# Patient Record
Sex: MALE | Race: WHITE | NOT HISPANIC OR LATINO | Employment: FULL TIME | URBAN - METROPOLITAN AREA
[De-identification: names, ages, dates, MRNs, and addresses within clinical notes are randomized per-mention and may not be internally consistent; named-entity substitution may affect disease eponyms.]

---

## 2024-04-18 ENCOUNTER — HOSPITAL ENCOUNTER (EMERGENCY)
Facility: HOSPITAL | Age: 39
Discharge: HOME | End: 2024-04-19
Attending: EMERGENCY MEDICINE
Payer: COMMERCIAL

## 2024-04-18 DIAGNOSIS — F19.10 SUBSTANCE ABUSE (HCC): ICD-10-CM

## 2024-04-18 DIAGNOSIS — L03.115 CELLULITIS OF RIGHT ANKLE: Primary | ICD-10-CM

## 2024-04-18 LAB
ALBUMIN SERPL BCP-MCNC: 3.9 G/DL (ref 3.5–5)
ALP SERPL-CCNC: 44 U/L (ref 34–104)
ALT SERPL W P-5'-P-CCNC: 11 U/L (ref 7–52)
ANION GAP SERPL CALCULATED.3IONS-SCNC: 7 MMOL/L (ref 4–13)
APTT PPP: 33 SECONDS (ref 23–37)
AST SERPL W P-5'-P-CCNC: 16 U/L (ref 13–39)
BASOPHILS # BLD AUTO: 0.02 THOUSANDS/ÂΜL (ref 0–0.1)
BASOPHILS NFR BLD AUTO: 0 % (ref 0–1)
BILIRUB SERPL-MCNC: 0.66 MG/DL (ref 0.2–1)
BUN SERPL-MCNC: 13 MG/DL (ref 5–25)
CALCIUM SERPL-MCNC: 8.8 MG/DL (ref 8.4–10.2)
CARDIAC TROPONIN I PNL SERPL HS: <2 NG/L
CHLORIDE SERPL-SCNC: 102 MMOL/L (ref 96–108)
CO2 SERPL-SCNC: 30 MMOL/L (ref 21–32)
CREAT SERPL-MCNC: 0.81 MG/DL (ref 0.6–1.3)
EOSINOPHIL # BLD AUTO: 0.2 THOUSAND/ÂΜL (ref 0–0.61)
EOSINOPHIL NFR BLD AUTO: 3 % (ref 0–6)
ERYTHROCYTE [DISTWIDTH] IN BLOOD BY AUTOMATED COUNT: 12.8 % (ref 11.6–15.1)
GFR SERPL CREATININE-BSD FRML MDRD: 112 ML/MIN/1.73SQ M
GLUCOSE SERPL-MCNC: 127 MG/DL (ref 65–140)
HCT VFR BLD AUTO: 39.4 % (ref 36.5–49.3)
HGB BLD-MCNC: 12.7 G/DL (ref 12–17)
IMM GRANULOCYTES # BLD AUTO: 0.01 THOUSAND/UL (ref 0–0.2)
IMM GRANULOCYTES NFR BLD AUTO: 0 % (ref 0–2)
INR PPP: 1.05 (ref 0.84–1.19)
LACTATE SERPL-SCNC: 1.8 MMOL/L (ref 0.5–2)
LYMPHOCYTES # BLD AUTO: 1.2 THOUSANDS/ÂΜL (ref 0.6–4.47)
LYMPHOCYTES NFR BLD AUTO: 19 % (ref 14–44)
MCH RBC QN AUTO: 28 PG (ref 26.8–34.3)
MCHC RBC AUTO-ENTMCNC: 32.2 G/DL (ref 31.4–37.4)
MCV RBC AUTO: 87 FL (ref 82–98)
MONOCYTES # BLD AUTO: 0.66 THOUSAND/ÂΜL (ref 0.17–1.22)
MONOCYTES NFR BLD AUTO: 11 % (ref 4–12)
NEUTROPHILS # BLD AUTO: 4.17 THOUSANDS/ÂΜL (ref 1.85–7.62)
NEUTS SEG NFR BLD AUTO: 67 % (ref 43–75)
NRBC BLD AUTO-RTO: 0 /100 WBCS
PLATELET # BLD AUTO: 182 THOUSANDS/UL (ref 149–390)
PMV BLD AUTO: 9.4 FL (ref 8.9–12.7)
POTASSIUM SERPL-SCNC: 3.7 MMOL/L (ref 3.5–5.3)
PROCALCITONIN SERPL-MCNC: 0.29 NG/ML
PROT SERPL-MCNC: 6.5 G/DL (ref 6.4–8.4)
PROTHROMBIN TIME: 14.4 SECONDS (ref 11.6–14.5)
RBC # BLD AUTO: 4.53 MILLION/UL (ref 3.88–5.62)
SODIUM SERPL-SCNC: 139 MMOL/L (ref 135–147)
WBC # BLD AUTO: 6.26 THOUSAND/UL (ref 4.31–10.16)

## 2024-04-18 PROCEDURE — 36415 COLL VENOUS BLD VENIPUNCTURE: CPT

## 2024-04-18 PROCEDURE — 87040 BLOOD CULTURE FOR BACTERIA: CPT

## 2024-04-18 PROCEDURE — 99284 EMERGENCY DEPT VISIT MOD MDM: CPT

## 2024-04-18 PROCEDURE — 96367 TX/PROPH/DG ADDL SEQ IV INF: CPT

## 2024-04-18 PROCEDURE — 93005 ELECTROCARDIOGRAM TRACING: CPT

## 2024-04-18 PROCEDURE — 85610 PROTHROMBIN TIME: CPT

## 2024-04-18 PROCEDURE — 85025 COMPLETE CBC W/AUTO DIFF WBC: CPT

## 2024-04-18 PROCEDURE — 85730 THROMBOPLASTIN TIME PARTIAL: CPT

## 2024-04-18 PROCEDURE — 96365 THER/PROPH/DIAG IV INF INIT: CPT

## 2024-04-18 PROCEDURE — 80053 COMPREHEN METABOLIC PANEL: CPT

## 2024-04-18 PROCEDURE — 83605 ASSAY OF LACTIC ACID: CPT

## 2024-04-18 PROCEDURE — 84145 PROCALCITONIN (PCT): CPT

## 2024-04-18 PROCEDURE — 84484 ASSAY OF TROPONIN QUANT: CPT

## 2024-04-18 PROCEDURE — 99284 EMERGENCY DEPT VISIT MOD MDM: CPT | Performed by: EMERGENCY MEDICINE

## 2024-04-18 RX ADMIN — SODIUM CHLORIDE 1000 ML: 0.9 INJECTION, SOLUTION INTRAVENOUS at 21:51

## 2024-04-18 RX ADMIN — VANCOMYCIN HYDROCHLORIDE 1500 MG: 5 INJECTION, POWDER, LYOPHILIZED, FOR SOLUTION INTRAVENOUS at 22:30

## 2024-04-18 RX ADMIN — CEFEPIME 2000 MG: 2 INJECTION, POWDER, FOR SOLUTION INTRAVENOUS at 21:51

## 2024-04-19 VITALS
BODY MASS INDEX: 24.34 KG/M2 | HEART RATE: 88 BPM | WEIGHT: 170 LBS | OXYGEN SATURATION: 97 % | HEIGHT: 70 IN | TEMPERATURE: 98.2 F | SYSTOLIC BLOOD PRESSURE: 125 MMHG | RESPIRATION RATE: 21 BRPM | DIASTOLIC BLOOD PRESSURE: 71 MMHG

## 2024-04-19 LAB
ATRIAL RATE: 115 BPM
P AXIS: -10 DEGREES
PR INTERVAL: 126 MS
QRS AXIS: 73 DEGREES
QRSD INTERVAL: 80 MS
QT INTERVAL: 314 MS
QTC INTERVAL: 434 MS
T WAVE AXIS: 1 DEGREES
VENTRICULAR RATE: 115 BPM

## 2024-04-19 PROCEDURE — 93010 ELECTROCARDIOGRAM REPORT: CPT | Performed by: INTERNAL MEDICINE

## 2024-04-19 RX ORDER — CLINDAMYCIN HYDROCHLORIDE 150 MG/1
450 CAPSULE ORAL 3 TIMES DAILY
Qty: 63 CAPSULE | Refills: 0 | Status: SHIPPED | OUTPATIENT
Start: 2024-04-19 | End: 2024-04-26

## 2024-04-19 NOTE — DISCHARGE INSTRUCTIONS
Follow-up with primary care provider.  Take medicine as directed.  Look for signs and symptoms of worsening infection as discussed.  If any symptoms worsen or new symptoms appear return to the ER.

## 2024-04-19 NOTE — ED PROVIDER NOTES
"History  Chief Complaint   Patient presents with    Cellulitis     Currently staying at [facility] for hx of intravenous drug use, patient reports last using 4 days ago and injecting xylazine. Patient presents with redness and mild swelling at injection sites to bilateral forearms and bilateral ankles however, patient reports \"I had been clean for a while, all this started from getting high one time 4 days ago\".     Addiction Problem     The patient is a 38 y.o. male with a history of IV drug abuse who presents to [facility] with a chief complaint of cellulitis like-symptoms. Symptoms began 4 days ago after injecting multiple times with xylazine and have been constant since onset. His pain is currently rated as a 4/10 in severity and described as swelling of the right ankle without radiation. Associated symptoms include erythema. Symptoms are aggravated with palpation and alleviating factors include none noted. The patient denies fever, chills, night sweats, trauma, falls, deformity, dirty needle use, licking needles prior to injection, chest pain, shortness of breath, coldness of extremity. He reports taking benadryl prior to arrival without relief of symptoms. No other reported symptoms at this time.  Patient affirms allergies to thorazine          History provided by:  Patient   used: No    Addiction Problem  Associated symptoms: no abdominal pain, no palpitations, no seizures, no shortness of breath and no vomiting        None       History reviewed. No pertinent past medical history.    History reviewed. No pertinent surgical history.    History reviewed. No pertinent family history.  I have reviewed and agree with the history as documented.    E-Cigarette/Vaping    E-Cigarette Use Never User      E-Cigarette/Vaping Substances     Social History     Tobacco Use    Smoking status: Never    Smokeless tobacco: Never   Vaping Use    Vaping status: Never Used   Substance Use Topics " "   Alcohol use: Not Currently    Drug use: Yes     Types: Cocaine, \"Crack\" cocaine, Methamphetamines       Review of Systems   Constitutional:  Negative for chills and fever.   HENT:  Negative for ear pain and sore throat.    Eyes:  Negative for pain and visual disturbance.   Respiratory:  Negative for cough and shortness of breath.    Cardiovascular:  Negative for chest pain and palpitations.   Gastrointestinal:  Negative for abdominal pain and vomiting.   Genitourinary:  Negative for dysuria and hematuria.   Musculoskeletal:  Positive for myalgias. Negative for arthralgias and back pain.   Skin:  Positive for color change. Negative for rash.   Neurological:  Negative for seizures and syncope.   All other systems reviewed and are negative.      Physical Exam  Physical Exam  Vitals reviewed.   Constitutional:       General: He is not in acute distress.  HENT:      Head: Normocephalic and atraumatic.      Right Ear: Tympanic membrane, ear canal and external ear normal.      Left Ear: Tympanic membrane, ear canal and external ear normal.      Mouth/Throat:      Mouth: Mucous membranes are moist.      Pharynx: Oropharynx is clear. No oropharyngeal exudate.   Eyes:      General: No scleral icterus.     Extraocular Movements: Extraocular movements intact.      Conjunctiva/sclera: Conjunctivae normal.      Pupils: Pupils are equal, round, and reactive to light.   Cardiovascular:      Rate and Rhythm: Normal rate.      Pulses: Normal pulses.   Pulmonary:      Effort: Pulmonary effort is normal. No respiratory distress.      Breath sounds: No stridor. No wheezing, rhonchi or rales.   Chest:      Chest wall: No tenderness.   Abdominal:      General: Abdomen is flat.      Tenderness: There is no abdominal tenderness.   Musculoskeletal:         General: Normal range of motion.      Cervical back: Normal range of motion and neck supple.        Legs:       Comments: Back marks visible to bilateral arms as well as possibly legs.  " Motor and sensation intact to the leg and foot, DP and PT pulses 2+.  Small erythematous patch to the right ankle possibly cellulitis   Skin:     General: Skin is warm.      Capillary Refill: Capillary refill takes less than 2 seconds.      Findings: Erythema and lesion present.   Neurological:      Mental Status: He is alert and oriented to person, place, and time. Mental status is at baseline.         Vital Signs  ED Triage Vitals [04/18/24 1948]   Temperature Pulse Respirations Blood Pressure SpO2   98.2 °F (36.8 °C) (!) 120 14 132/87 100 %      Temp Source Heart Rate Source Patient Position - Orthostatic VS BP Location FiO2 (%)   Temporal -- Sitting Left arm --      Pain Score       --           Vitals:    04/18/24 1948 04/18/24 2311 04/18/24 2330 04/19/24 0000   BP: 132/87  128/70 125/71   Pulse: (!) 120 80 84 88   Patient Position - Orthostatic VS: Sitting            Visual Acuity      ED Medications  Medications   vancomycin (VANCOCIN) 1500 mg in sodium chloride 0.9% 250 mL IVPB (0 mg Intravenous Stopped 4/19/24 0000)   cefepime (MAXIPIME) 2 g/50 mL dextrose IVPB (0 mg Intravenous Stopped 4/18/24 2221)   sodium chloride 0.9 % bolus 1,000 mL (0 mL Intravenous Stopped 4/18/24 2251)       Diagnostic Studies  Results Reviewed       Procedure Component Value Units Date/Time    Procalcitonin [[id number]]  (Abnormal) Collected: 04/18/24 2110    Lab Status: Final result Specimen: Blood from Arm, Left Updated: 04/18/24 2201     Procalcitonin 0.29 ng/ml     HS Troponin 0hr (reflex protocol) [[id number]]  (Normal) Collected: 04/18/24 2110    Lab Status: Final result Specimen: Blood from Arm, Left Updated: 04/18/24 2154     hs TnI 0hr <2 ng/L     Comprehensive metabolic panel [[id number]] Collected: 04/18/24 2110    Lab Status: Final result Specimen: Blood from Arm, Left Updated: 04/18/24 2151     Sodium 139 mmol/L      Potassium 3.7 mmol/L      Chloride 102 mmol/L      CO2 30 mmol/L      ANION GAP 7 mmol/L      BUN 13  mg/dL      Creatinine 0.81 mg/dL      Glucose 127 mg/dL      Calcium 8.8 mg/dL      AST 16 U/L      ALT 11 U/L      Alkaline Phosphatase 44 U/L      Total Protein 6.5 g/dL      Albumin 3.9 g/dL      Total Bilirubin 0.66 mg/dL      eGFR 112 ml/min/1.73sq m     Narrative:      National Kid[employer] Foundation guidelines for Chronic Kidney Disease (CKD):     Stage 1 with normal or high GFR (GFR > 90 mL/min/1.73 square meters)    Stage 2 Mild CKD (GFR = 60-89 mL/min/1.73 square meters)    Stage 3A Moderate CKD (GFR = 45-59 mL/min/1.73 square meters)    Stage 3B Moderate CKD (GFR = 30-44 mL/min/1.73 square meters)    Stage 4 Severe CKD (GFR = 15-29 mL/min/1.73 square meters)    Stage 5 End Stage CKD (GFR <15 mL/min/1.73 square meters)  Note: GFR calculation is accurate only with a steady state creatinine    Lactic acid [[id number]]  (Normal) Collected: 04/18/24 2110    Lab Status: Final result Specimen: Blood from Arm, Left Updated: 04/18/24 2151     LACTIC ACID 1.8 mmol/L     Narrative:      Result may be elevated if tourniquet was used during collection.    Protime-INR [[id number]]  (Normal) Collected: 04/18/24 2110    Lab Status: Final result Specimen: Blood from Arm, Left Updated: 04/18/24 2145     Protime 14.4 seconds      INR 1.05    APTT [[id number]]  (Normal) Collected: 04/18/24 2110    Lab Status: Final result Specimen: Blood from Arm, Left Updated: 04/18/24 2145     PTT 33 seconds     Blood culture #2 [[id number]] Collected: 04/18/24 2110    Lab Status: In process Specimen: Blood from Arm, Left Updated: 04/18/24 2135    Blood culture #1 [[id number]] Collected: 04/18/24 2110    Lab Status: In process Specimen: Blood from Arm, Left Updated: 04/18/24 2135    CBC and differential [[id number]] Collected: 04/18/24 2110    Lab Status: Final result Specimen: Blood from Arm, Left Updated: 04/18/24 2122     WBC 6.26 Thousand/uL      RBC 4.53 Million/uL      Hemoglobin 12.7 g/dL      Hematocrit 39.4 %      MCV 87 fL      MCH  28.0 pg      MCHC 32.2 g/dL      RDW 12.8 %      MPV 9.4 fL      Platelets 182 Thousands/uL      nRBC 0 /100 WBCs      Segmented % 67 %      Immature Grans % 0 %      Lymphocytes % 19 %      Monocytes % 11 %      Eosinophils Relative 3 %      Basophils Relative 0 %      Absolute Neutrophils 4.17 Thousands/µL      Absolute Immature Grans 0.01 Thousand/uL      Absolute Lymphocytes 1.20 Thousands/µL      Absolute Monocytes 0.66 Thousand/µL      Eosinophils Absolute 0.20 Thousand/µL      Basophils Absolute 0.02 Thousands/µL                    No orders to display              Procedures  Procedures         ED Course  ED Course as of 04/19/24 0643   Thu Apr 18, 2024   2156 hs TnI 0hr: <2   2217 Procalcitonin(!): 0.29             HEART Risk Score      Flowsheet Row Most Recent Value   Heart Score Risk Calculator    History 0 Filed at: 04/19/2024 0643   ECG 1 Filed at: 04/19/2024 0643   Age 0 Filed at: 04/19/2024 0643   Risk Factors 1 Filed at: 04/19/2024 0643   Troponin 0 Filed at: 04/19/2024 0643   HEART Score 2 Filed at: 04/19/2024 0643                          SBIRT 22yo+      Flowsheet Row Most Recent Value   Initial Alcohol Screen: US AUDIT-C     1. How often do you have a drink containing alcohol? 0 Filed at: 04/18/2024 2041   2. How many drinks containing alcohol do you have on a typical day you are drinking?  0 Filed at: 04/18/2024 2041   3a. Male UNDER 65: How often do you have five or more drinks on one occasion? 0 Filed at: 04/18/2024 2041   Audit-C Score 0 Filed at: 04/18/2024 2041   JOHN: How many times in the past year have you...    Used an illegal drug or used a prescription medication for non-medical reasons? Never Filed at: 04/18/2024 2041                      Medical Decision Making  The patient is a 38 y.o. male with a history of IV drug abuse who presents to [facility] with a chief complaint of cellulitis like-symptoms.  Patient reports a history of IV heroin use.  Patient reports  that he was clean for a long time however injected denies any multiple times with different clean needles into his arms 4 to 5 days ago.  Patient reports increased itching and erythema. No crepitus, bony tenderness, or decreased ROM. Patient has a patch of erythema and pain to the right ankle.  Patient vital signs within normal limits and is afebrile.  Lab work unremarkable.  Shared decision making was had about trialing outpatient abx vs inpatient management. Patient would like to trial outpatient abx and if it worsens he will return. Discussed red flag symptoms and when to return with strict return parameters. Prior to discharge, discharge instructions were discussed with patient at bedside. Patient was provided both verbal and written instructions. Patient is understanding of the discharge instructions and is agreeable to plan of care. Return precautions were discussed with patient bedside, patient verbalized understanding of signs and symptoms that would necessitate return to the ED. All questions were answered. Patient was comfortable with the plan of care and discharged to home.       Problems Addressed:  Cellulitis of right ankle: acute illness or injury  Substance abuse (HCC): acute illness or injury    Amount and/or Complexity of Data Reviewed  Labs: ordered. Decision-making details documented in ED Course.    Risk  Prescription drug management.             Disposition  Final diagnoses:   Cellulitis of right ankle   Substance abuse (HCC)     Time reflects when diagnosis was documented in both MDM as applicable and the Disposition within this note       Time User Action Codes Description Comment    4/19/2024 12:25 AM Michael Bender Add [L03.115] Cellulitis of right leg     4/19/2024 12:25 AM Michael Bender Remove [L03.115] Cellulitis of right leg     4/19/2024 12:25 AM Michael Bender Add [L03.115] Cellulitis of right ankle     4/19/2024 12:25 AM Michael Bender Add [F19.10] Substance abuse (HCC)           ED  Disposition       ED Disposition   Discharge    Condition   Stable    Date/Time   Fri Apr 19, 2024 0025    Comment   Pierre Oliveira discharge to home/self care.                   Follow-up Information    None         Discharge Medication List as of 4/19/2024 12:26 AM        START taking these medications    Details   clindamycin (CLEOCIN) 150 mg capsule Take 3 capsules (450 mg total) by mouth 3 (three) times a day for 7 days, Starting Fri 4/19/2024, Until Fri 4/26/2024, Print             No discharge procedures on file.    PDMP Review       None            ED Provider  Electronically Signed by             Michael Bender PA-C  04/19/24 0643

## 2024-04-24 LAB
BACTERIA BLD CULT: NORMAL
BACTERIA BLD CULT: NORMAL